# Patient Record
Sex: MALE | Race: WHITE
[De-identification: names, ages, dates, MRNs, and addresses within clinical notes are randomized per-mention and may not be internally consistent; named-entity substitution may affect disease eponyms.]

---

## 2018-03-22 ENCOUNTER — HOSPITAL ENCOUNTER (OUTPATIENT)
Dept: HOSPITAL 19 - COL.RAD | Age: 83
End: 2018-03-22
Attending: FAMILY MEDICINE
Payer: MEDICARE

## 2018-03-22 DIAGNOSIS — N40.0: Primary | ICD-10-CM

## 2018-03-22 DIAGNOSIS — K57.90: ICD-10-CM

## 2018-05-06 ENCOUNTER — HOSPITAL ENCOUNTER (EMERGENCY)
Dept: HOSPITAL 19 - COL.ER | Age: 83
Discharge: HOME | End: 2018-05-06
Payer: MEDICARE

## 2018-05-06 VITALS — HEART RATE: 64 BPM | SYSTOLIC BLOOD PRESSURE: 133 MMHG | DIASTOLIC BLOOD PRESSURE: 67 MMHG

## 2018-05-06 VITALS — WEIGHT: 170.42 LBS | HEIGHT: 70.98 IN | BODY MASS INDEX: 23.86 KG/M2

## 2018-05-06 VITALS — TEMPERATURE: 97 F

## 2018-05-06 DIAGNOSIS — I48.92: Primary | ICD-10-CM

## 2018-05-06 DIAGNOSIS — I48.91: ICD-10-CM

## 2018-05-06 DIAGNOSIS — I25.10: ICD-10-CM

## 2018-05-06 DIAGNOSIS — Z79.82: ICD-10-CM

## 2018-05-06 DIAGNOSIS — Z86.73: ICD-10-CM

## 2018-05-06 DIAGNOSIS — R55: ICD-10-CM

## 2018-05-06 DIAGNOSIS — Z79.02: ICD-10-CM

## 2018-05-06 LAB
ALBUMIN SERPL-MCNC: 3.2 GM/DL (ref 3.5–5)
ALP SERPL-CCNC: 52 U/L (ref 50–136)
ALT SERPL-CCNC: 29 U/L (ref 21–72)
ANION GAP SERPL CALC-SCNC: 10 MMOL/L (ref 7–16)
AST SERPL-CCNC: 18 U/L (ref 15–37)
BASOPHILS # BLD: 0.1 10*3/UL (ref 0–0.2)
BASOPHILS NFR BLD AUTO: 0.5 % (ref 0–2)
BILIRUB SERPL-MCNC: 1.3 MG/DL (ref 0–1)
BUN SERPL-MCNC: 20 MG/DL (ref 9–20)
CALCIUM SERPL-MCNC: 8 MG/DL (ref 8.4–10.2)
CHLORIDE SERPL-SCNC: 107 MMOL/L (ref 98–107)
CO2 SERPL-SCNC: 23 MMOL/L (ref 22–30)
CREAT SERPL-SCNC: 1.17 MG/DL (ref 0.66–1.25)
EOSINOPHIL # BLD: 0 10*3/UL (ref 0–0.7)
EOSINOPHIL NFR BLD: 0 % (ref 0–4)
ERYTHROCYTE [DISTWIDTH] IN BLOOD BY AUTOMATED COUNT: 12 % (ref 11.5–14.5)
GLUCOSE SERPL-MCNC: 115 MG/DL (ref 74–106)
GRANULOCYTES # BLD AUTO: 75.4 % (ref 42.2–75.2)
HCT VFR BLD AUTO: 44.7 % (ref 42–52)
HGB BLD-MCNC: 15.3 G/DL (ref 13.5–18)
LYMPHOCYTES # BLD: 1.7 10*3/UL (ref 1.2–3.4)
LYMPHOCYTES NFR BLD: 14.5 % (ref 20–51)
MCH RBC QN AUTO: 32 PG (ref 27–31)
MCHC RBC AUTO-ENTMCNC: 34 G/DL (ref 33–37)
MCV RBC AUTO: 93 FL (ref 80–100)
MONOCYTES # BLD: 1.1 10*3/UL (ref 0.1–0.6)
MONOCYTES NFR BLD AUTO: 9.3 % (ref 1.7–9.3)
NEUTROPHILS # BLD: 9 10*3/UL (ref 1.4–6.5)
PLATELET # BLD AUTO: 155 K/MM3 (ref 130–400)
PMV BLD AUTO: 9.9 FL (ref 7.4–10.4)
POTASSIUM SERPL-SCNC: 3.8 MMOL/L (ref 3.4–5)
PROT SERPL-MCNC: 6.1 GM/DL (ref 6.4–8.2)
RBC # BLD AUTO: 4.83 M/MM3 (ref 4.2–5.6)
SODIUM SERPL-SCNC: 140 MMOL/L (ref 137–145)

## 2020-09-08 ENCOUNTER — HOSPITAL ENCOUNTER (EMERGENCY)
Dept: HOSPITAL 19 - COL.ER | Age: 85
Discharge: HOME | End: 2020-09-08
Payer: MEDICARE

## 2020-09-08 VITALS — TEMPERATURE: 97.2 F

## 2020-09-08 VITALS — BODY MASS INDEX: 24.4 KG/M2 | HEIGHT: 70 IN | WEIGHT: 170.42 LBS

## 2020-09-08 VITALS — HEART RATE: 77 BPM

## 2020-09-08 DIAGNOSIS — Z79.01: ICD-10-CM

## 2020-09-08 DIAGNOSIS — E78.5: ICD-10-CM

## 2020-09-08 DIAGNOSIS — I25.10: ICD-10-CM

## 2020-09-08 DIAGNOSIS — Z88.0: ICD-10-CM

## 2020-09-08 DIAGNOSIS — E87.5: Primary | ICD-10-CM

## 2020-09-08 DIAGNOSIS — Z79.02: ICD-10-CM

## 2020-09-08 DIAGNOSIS — Z79.82: ICD-10-CM

## 2020-09-08 DIAGNOSIS — Z95.1: ICD-10-CM

## 2020-09-08 DIAGNOSIS — I10: ICD-10-CM

## 2020-09-08 LAB
ALBUMIN SERPL-MCNC: 4.4 GM/DL (ref 3.5–5)
ALP SERPL-CCNC: 75 U/L (ref 50–136)
ALT SERPL-CCNC: 30 U/L (ref 4–49)
ANION GAP SERPL CALC-SCNC: 11 MMOL/L (ref 7–16)
AST SERPL-CCNC: 34 U/L (ref 15–37)
BASOPHILS # BLD: 0.1 10*3/UL (ref 0–0.2)
BASOPHILS NFR BLD AUTO: 0.7 % (ref 0–2)
BILIRUB SERPL-MCNC: 1.5 MG/DL (ref 0–1)
BUN SERPL-MCNC: 20 MG/DL (ref 9–20)
CALCIUM SERPL-MCNC: 8.9 MG/DL (ref 8.4–10.2)
CHLORIDE SERPL-SCNC: 102 MMOL/L (ref 98–107)
CO2 SERPL-SCNC: 25 MMOL/L (ref 22–30)
CREAT SERPL-SCNC: 1.48 UMOL/L (ref 0.66–1.25)
CRP SERPL-MCNC: < 0.5 MG/DL (ref 0–0.9)
EOSINOPHIL # BLD: 0 10*3/UL (ref 0–0.7)
EOSINOPHIL NFR BLD: 0.2 % (ref 0–4)
ERYTHROCYTE [DISTWIDTH] IN BLOOD BY AUTOMATED COUNT: 11.9 % (ref 11.5–14.5)
GLUCOSE SERPL-MCNC: 125 MG/DL (ref 74–106)
GRANULOCYTES # BLD AUTO: 63.8 % (ref 42.2–75.2)
HCT VFR BLD AUTO: 46.6 % (ref 42–52)
HGB BLD-MCNC: 16 G/DL (ref 13.5–18)
INR BLD: 1.2 (ref 0.8–3)
LYMPHOCYTES # BLD: 2.1 10*3/UL (ref 1.2–3.4)
LYMPHOCYTES NFR BLD: 25.3 % (ref 20–51)
MAGNESIUM SERPL-MCNC: 2.1 MG/DL (ref 1.6–2.3)
MCH RBC QN AUTO: 32 PG (ref 27–31)
MCHC RBC AUTO-ENTMCNC: 34 G/DL (ref 33–37)
MCV RBC AUTO: 92 FL (ref 80–100)
MONOCYTES # BLD: 0.8 10*3/UL (ref 0.1–0.6)
MONOCYTES NFR BLD AUTO: 9.6 % (ref 1.7–9.3)
NEUTROPHILS # BLD: 5.2 10*3/UL (ref 1.4–6.5)
PLATELET # BLD AUTO: 174 K/MM3 (ref 130–400)
PMV BLD AUTO: 10.6 FL (ref 7.4–10.4)
POTASSIUM SERPL-SCNC: 3.9 MMOL/L (ref 3.4–5)
PROT SERPL-MCNC: 7.5 GM/DL (ref 6.4–8.2)
PROTHROMBIN TIME: 13.5 SECONDS (ref 9.7–12.8)
RBC # BLD AUTO: 5.05 M/MM3 (ref 4.2–5.6)
SODIUM SERPL-SCNC: 137 MMOL/L (ref 137–145)
TROPONIN I SERPL-MCNC: < 0.012 NG/ML (ref 0–0.04)

## 2020-10-05 ENCOUNTER — HOSPITAL ENCOUNTER (OUTPATIENT)
Dept: HOSPITAL 19 - COL.ER | Age: 85
Setting detail: OBSERVATION
LOS: 2 days | Discharge: HOME | End: 2020-10-07
Attending: STUDENT IN AN ORGANIZED HEALTH CARE EDUCATION/TRAINING PROGRAM | Admitting: STUDENT IN AN ORGANIZED HEALTH CARE EDUCATION/TRAINING PROGRAM
Payer: MEDICARE

## 2020-10-05 VITALS — SYSTOLIC BLOOD PRESSURE: 148 MMHG | DIASTOLIC BLOOD PRESSURE: 62 MMHG | HEART RATE: 68 BPM

## 2020-10-05 VITALS — SYSTOLIC BLOOD PRESSURE: 151 MMHG | DIASTOLIC BLOOD PRESSURE: 54 MMHG | TEMPERATURE: 98 F | HEART RATE: 62 BPM

## 2020-10-05 VITALS — HEIGHT: 70 IN | BODY MASS INDEX: 24.21 KG/M2 | WEIGHT: 169.09 LBS

## 2020-10-05 DIAGNOSIS — Z95.2: ICD-10-CM

## 2020-10-05 DIAGNOSIS — Z23: ICD-10-CM

## 2020-10-05 DIAGNOSIS — Z79.01: ICD-10-CM

## 2020-10-05 DIAGNOSIS — Z90.49: ICD-10-CM

## 2020-10-05 DIAGNOSIS — E03.9: ICD-10-CM

## 2020-10-05 DIAGNOSIS — Z79.82: ICD-10-CM

## 2020-10-05 DIAGNOSIS — N40.0: ICD-10-CM

## 2020-10-05 DIAGNOSIS — Z95.0: ICD-10-CM

## 2020-10-05 DIAGNOSIS — Z86.718: ICD-10-CM

## 2020-10-05 DIAGNOSIS — Z88.0: ICD-10-CM

## 2020-10-05 DIAGNOSIS — N18.9: ICD-10-CM

## 2020-10-05 DIAGNOSIS — Z95.1: ICD-10-CM

## 2020-10-05 DIAGNOSIS — I45.10: ICD-10-CM

## 2020-10-05 DIAGNOSIS — Z95.818: ICD-10-CM

## 2020-10-05 DIAGNOSIS — I65.21: ICD-10-CM

## 2020-10-05 DIAGNOSIS — R55: Primary | ICD-10-CM

## 2020-10-05 DIAGNOSIS — Z86.73: ICD-10-CM

## 2020-10-05 DIAGNOSIS — I25.10: ICD-10-CM

## 2020-10-05 DIAGNOSIS — I67.9: ICD-10-CM

## 2020-10-05 DIAGNOSIS — E78.5: ICD-10-CM

## 2020-10-05 DIAGNOSIS — I12.9: ICD-10-CM

## 2020-10-05 DIAGNOSIS — I48.91: ICD-10-CM

## 2020-10-05 LAB
ALBUMIN SERPL-MCNC: 4 GM/DL (ref 3.5–5)
ALP SERPL-CCNC: 62 U/L (ref 50–136)
ALT SERPL-CCNC: 28 U/L (ref 4–49)
ANION GAP SERPL CALC-SCNC: 8 MMOL/L (ref 7–16)
AST SERPL-CCNC: 26 U/L (ref 15–37)
BASOPHILS # BLD: 0.1 10*3/UL (ref 0–0.2)
BASOPHILS NFR BLD AUTO: 1 % (ref 0–2)
BILIRUB SERPL-MCNC: 1.3 MG/DL (ref 0–1)
BUN SERPL-MCNC: 22 MG/DL (ref 9–20)
CALCIUM SERPL-MCNC: 8.5 MG/DL (ref 8.4–10.2)
CHLORIDE SERPL-SCNC: 107 MMOL/L (ref 98–107)
CO2 SERPL-SCNC: 25 MMOL/L (ref 22–30)
CREAT SERPL-SCNC: 1.58 UMOL/L (ref 0.66–1.25)
EOSINOPHIL # BLD: 0 10*3/UL (ref 0–0.7)
EOSINOPHIL NFR BLD: 0.1 % (ref 0–4)
ERYTHROCYTE [DISTWIDTH] IN BLOOD BY AUTOMATED COUNT: 12 % (ref 11.5–14.5)
GLUCOSE SERPL-MCNC: 125 MG/DL (ref 74–106)
GRANULOCYTES # BLD AUTO: 62.5 % (ref 42.2–75.2)
HCT VFR BLD AUTO: 44.5 % (ref 42–52)
HGB BLD-MCNC: 15.1 G/DL (ref 13.5–18)
HYALINE CASTS #/AREA URNS LPF: (no result) /LPF
LYMPHOCYTES # BLD: 1.9 10*3/UL (ref 1.2–3.4)
LYMPHOCYTES NFR BLD: 25.2 % (ref 20–51)
MCH RBC QN AUTO: 32 PG (ref 27–31)
MCHC RBC AUTO-ENTMCNC: 34 G/DL (ref 33–37)
MCV RBC AUTO: 94 FL (ref 80–100)
MONOCYTES # BLD: 0.8 10*3/UL (ref 0.1–0.6)
MONOCYTES NFR BLD AUTO: 10.7 % (ref 1.7–9.3)
NEUTROPHILS # BLD: 4.8 10*3/UL (ref 1.4–6.5)
PH UR STRIP.AUTO: 6 [PH] (ref 5–8)
PLATELET # BLD AUTO: 166 K/MM3 (ref 130–400)
PMV BLD AUTO: 10.2 FL (ref 7.4–10.4)
POTASSIUM SERPL-SCNC: 4.9 MMOL/L (ref 3.4–5)
PROT SERPL-MCNC: 6.8 GM/DL (ref 6.4–8.2)
RBC # BLD AUTO: 4.76 M/MM3 (ref 4.2–5.6)
RBC # UR: (no result) /HPF
SODIUM SERPL-SCNC: 140 MMOL/L (ref 137–145)
SP GR UR STRIP.AUTO: 1.01 (ref 1–1.03)
TROPONIN I SERPL-MCNC: < 0.012 NG/ML (ref 0–0.04)
URN COLLECT METHOD CLASS: (no result)
UROBILINOGEN UR STRIP.AUTO-MCNC: 2 MG/DL

## 2020-10-05 PROCEDURE — A9585 GADOBUTROL INJECTION: HCPCS

## 2020-10-05 PROCEDURE — G0378 HOSPITAL OBSERVATION PER HR: HCPCS

## 2020-10-05 NOTE — NUR
Pt. arrived to the floor via wheelchair.  Pt. is A&OX3, assessment complete.
INT to lt. ac patent, IV fluids infusing per orders.  Pt. denies pain or other
needs, call light within reach.

## 2020-10-06 VITALS — DIASTOLIC BLOOD PRESSURE: 52 MMHG | SYSTOLIC BLOOD PRESSURE: 138 MMHG | TEMPERATURE: 98.3 F | HEART RATE: 61 BPM

## 2020-10-06 VITALS — TEMPERATURE: 97.7 F | HEART RATE: 54 BPM | DIASTOLIC BLOOD PRESSURE: 56 MMHG | SYSTOLIC BLOOD PRESSURE: 129 MMHG

## 2020-10-06 VITALS — TEMPERATURE: 97.6 F | HEART RATE: 71 BPM | DIASTOLIC BLOOD PRESSURE: 61 MMHG | SYSTOLIC BLOOD PRESSURE: 159 MMHG

## 2020-10-06 VITALS — SYSTOLIC BLOOD PRESSURE: 148 MMHG | TEMPERATURE: 98.2 F | HEART RATE: 74 BPM | DIASTOLIC BLOOD PRESSURE: 53 MMHG

## 2020-10-06 VITALS — TEMPERATURE: 97.5 F | SYSTOLIC BLOOD PRESSURE: 150 MMHG | HEART RATE: 66 BPM | DIASTOLIC BLOOD PRESSURE: 57 MMHG

## 2020-10-06 VITALS — SYSTOLIC BLOOD PRESSURE: 148 MMHG | DIASTOLIC BLOOD PRESSURE: 53 MMHG

## 2020-10-06 LAB
DIGOXIN SERPL-MCNC: 12.3 NG/ML (ref 3.7–17.9)
MAGNESIUM SERPL-MCNC: 2.2 MG/DL (ref 1.6–2.3)
TSH SERPL DL<=0.005 MIU/L-ACNC: 0.9 UIU/ML (ref 0.47–4.68)

## 2020-10-06 NOTE — NUR
met with patient to discuss discharge planning. Patient lives in
[place] with his wife, Sunita (ph#[phone]) who is also at bedside.
Patient sees Dr. Walden for primary care and picks up medications from [facility] with no difficulties. Patient does not use any DME and reports
independence with ADLS. Patient states he has DPOA-HC which designates his
wife, Sunita and his daughter, Aleena Nice but there is no copy in EMR.
Patient plans to return home at discharge and states he was ready to go home
today. SW will continue to follow as needed.

## 2020-10-06 NOTE — NUR
Went up to Patient's room to complete EEG as discussed prior with nurse.
Patient was also about to go to MRI. MRI would not be able to be completed
again until next week with Yass[employer] from [employer] available for pacemaker.
Patient's EEG will be completed tomorrow early morning. Discussed with
patient, his wife, and RN.
 
[place], RRT

## 2020-10-06 NOTE — NUR
Pt. sitting up in bed. Pt. is A&OX3, assessment complete. INT to lt. ac
patent.  Pt. denies pain or other needs, call light within reach.

## 2020-10-06 NOTE — NUR
Patient in bed resting. Alert and oriented x 3. Assessment complete. Denies
pain at this time. Denies further needs at this time.

## 2020-10-06 NOTE — NUR
Patient has done well throughout the day. Denies pain or further syncopal
episodes. Denies further needs at this time. Will report off to night shift.

## 2020-10-07 VITALS — TEMPERATURE: 98.8 F | HEART RATE: 67 BPM | DIASTOLIC BLOOD PRESSURE: 46 MMHG | SYSTOLIC BLOOD PRESSURE: 120 MMHG

## 2020-10-07 VITALS — SYSTOLIC BLOOD PRESSURE: 164 MMHG | TEMPERATURE: 98.3 F | DIASTOLIC BLOOD PRESSURE: 58 MMHG | HEART RATE: 68 BPM

## 2020-10-07 VITALS — TEMPERATURE: 98.5 F | DIASTOLIC BLOOD PRESSURE: 59 MMHG | HEART RATE: 65 BPM | SYSTOLIC BLOOD PRESSURE: 145 MMHG

## 2020-10-07 LAB
ANION GAP SERPL CALC-SCNC: 9 MMOL/L (ref 7–16)
BASOPHILS # BLD: 0.1 10*3/UL (ref 0–0.2)
BASOPHILS NFR BLD AUTO: 0.9 % (ref 0–2)
BUN SERPL-MCNC: 18 MG/DL (ref 9–20)
CALCIUM SERPL-MCNC: 8.7 MG/DL (ref 8.4–10.2)
CHLORIDE SERPL-SCNC: 108 MMOL/L (ref 98–107)
CO2 SERPL-SCNC: 22 MMOL/L (ref 22–30)
CREAT SERPL-SCNC: 1.31 UMOL/L (ref 0.66–1.25)
EOSINOPHIL # BLD: 0 10*3/UL (ref 0–0.7)
EOSINOPHIL NFR BLD: 0 % (ref 0–4)
ERYTHROCYTE [DISTWIDTH] IN BLOOD BY AUTOMATED COUNT: 12 % (ref 11.5–14.5)
GLUCOSE SERPL-MCNC: 88 MG/DL (ref 74–106)
GRANULOCYTES # BLD AUTO: 57.9 % (ref 42.2–75.2)
HCT VFR BLD AUTO: 43.3 % (ref 42–52)
HGB BLD-MCNC: 14.8 G/DL (ref 13.5–18)
LYMPHOCYTES # BLD: 2.4 10*3/UL (ref 1.2–3.4)
LYMPHOCYTES NFR BLD: 29.8 % (ref 20–51)
MCH RBC QN AUTO: 32 PG (ref 27–31)
MCHC RBC AUTO-ENTMCNC: 34 G/DL (ref 33–37)
MCV RBC AUTO: 93 FL (ref 80–100)
MONOCYTES # BLD: 0.9 10*3/UL (ref 0.1–0.6)
MONOCYTES NFR BLD AUTO: 11 % (ref 1.7–9.3)
NEUTROPHILS # BLD: 4.6 10*3/UL (ref 1.4–6.5)
PLATELET # BLD AUTO: 156 K/MM3 (ref 130–400)
PMV BLD AUTO: 10.9 FL (ref 7.4–10.4)
POTASSIUM SERPL-SCNC: 4.2 MMOL/L (ref 3.4–5)
RBC # BLD AUTO: 4.66 M/MM3 (ref 4.2–5.6)
SODIUM SERPL-SCNC: 139 MMOL/L (ref 137–145)

## 2020-10-07 NOTE — NUR
Patient alert and oriented, answers questions appropriately.  See assessment.
No of syncope or vertigo.  No other c/o at this time.

## 2020-10-07 NOTE — NUR
Discharge instructions reviewed with patient and spouse, verbalized
understanding.  Discharged via wheelchair to auto/home with spouse at 1130.

## 2021-05-19 ENCOUNTER — HOSPITAL ENCOUNTER (EMERGENCY)
Dept: HOSPITAL 19 - COL.ER | Age: 86
Discharge: HOME | End: 2021-05-19
Payer: MEDICARE

## 2021-05-19 VITALS — BODY MASS INDEX: 22.95 KG/M2 | HEIGHT: 70 IN | WEIGHT: 160.28 LBS

## 2021-05-19 VITALS — HEART RATE: 68 BPM | TEMPERATURE: 97.9 F | SYSTOLIC BLOOD PRESSURE: 118 MMHG | DIASTOLIC BLOOD PRESSURE: 54 MMHG

## 2021-05-19 DIAGNOSIS — Z88.0: ICD-10-CM

## 2021-05-19 DIAGNOSIS — N18.9: ICD-10-CM

## 2021-05-19 DIAGNOSIS — I25.10: ICD-10-CM

## 2021-05-19 DIAGNOSIS — Z79.82: ICD-10-CM

## 2021-05-19 DIAGNOSIS — Z79.01: ICD-10-CM

## 2021-05-19 DIAGNOSIS — I48.91: ICD-10-CM

## 2021-05-19 DIAGNOSIS — Z86.73: ICD-10-CM

## 2021-05-19 DIAGNOSIS — Z79.890: ICD-10-CM

## 2021-05-19 DIAGNOSIS — E78.5: ICD-10-CM

## 2021-05-19 DIAGNOSIS — Z95.9: ICD-10-CM

## 2021-05-19 DIAGNOSIS — R55: Primary | ICD-10-CM

## 2021-05-19 DIAGNOSIS — Z95.0: ICD-10-CM

## 2021-05-19 DIAGNOSIS — I12.9: ICD-10-CM

## 2021-05-19 DIAGNOSIS — E03.9: ICD-10-CM

## 2021-05-19 LAB
ALBUMIN SERPL-MCNC: 4.1 GM/DL (ref 3.5–5)
ALP SERPL-CCNC: 63 U/L (ref 50–136)
ALT SERPL-CCNC: 16 U/L (ref 4–49)
ANION GAP SERPL CALC-SCNC: 10 MMOL/L (ref 7–16)
APTT PPP: 28.4 SECONDS (ref 26–37)
AST SERPL-CCNC: 25 U/L (ref 15–37)
BASOPHILS # BLD: 0.1 10*3/UL (ref 0–0.2)
BASOPHILS NFR BLD AUTO: 0.5 % (ref 0–2)
BILIRUB SERPL-MCNC: 1.6 MG/DL (ref 0–1)
BUN SERPL-MCNC: 21 MG/DL (ref 9–20)
CALCIUM SERPL-MCNC: 9 MG/DL (ref 8.4–10.2)
CHLORIDE SERPL-SCNC: 103 MMOL/L (ref 98–107)
CO2 SERPL-SCNC: 25 MMOL/L (ref 22–30)
CREAT SERPL-SCNC: 1.22 UMOL/L (ref 0.66–1.25)
EOSINOPHIL # BLD: 0 10*3/UL (ref 0–0.7)
EOSINOPHIL NFR BLD: 0.2 % (ref 0–4)
ERYTHROCYTE [DISTWIDTH] IN BLOOD BY AUTOMATED COUNT: 11.6 % (ref 11.5–14.5)
GLUCOSE SERPL-MCNC: 122 MG/DL (ref 74–106)
GRANULOCYTES # BLD AUTO: 74.8 % (ref 42.2–75.2)
HCT VFR BLD AUTO: 45.2 % (ref 42–52)
HGB BLD-MCNC: 15.6 G/DL (ref 13.5–18)
INR BLD: 1.2 (ref 0.8–3)
LYMPHOCYTES # BLD: 1.8 10*3/UL (ref 1.2–3.4)
LYMPHOCYTES NFR BLD: 14.8 % (ref 20–51)
MCH RBC QN AUTO: 31 PG (ref 27–31)
MCHC RBC AUTO-ENTMCNC: 35 G/DL (ref 33–37)
MCV RBC AUTO: 90 FL (ref 80–100)
MONOCYTES # BLD: 1.1 10*3/UL (ref 0.1–0.6)
MONOCYTES NFR BLD AUTO: 9.3 % (ref 1.7–9.3)
NEUTROPHILS # BLD: 8.9 10*3/UL (ref 1.4–6.5)
PH UR STRIP.AUTO: 6 [PH] (ref 5–8)
PLATELET # BLD AUTO: 168 K/MM3 (ref 130–400)
PMV BLD AUTO: 10.6 FL (ref 7.4–10.4)
POTASSIUM SERPL-SCNC: 4.1 MMOL/L (ref 3.4–5)
PROT SERPL-MCNC: 7.3 GM/DL (ref 6.4–8.2)
PROTHROMBIN TIME: 13.7 SECONDS (ref 9.7–12.8)
RBC # BLD AUTO: 5.03 M/MM3 (ref 4.2–5.6)
RBC # UR: (no result) /HPF
SODIUM SERPL-SCNC: 138 MMOL/L (ref 137–145)
SP GR UR STRIP.AUTO: 1.01 (ref 1–1.03)
TROPONIN I SERPL-MCNC: 0.02 NG/ML (ref 0–0.04)
URN COLLECT METHOD CLASS: (no result)
UROBILINOGEN UR STRIP.AUTO-MCNC: 2 MG/DL

## 2021-06-02 ENCOUNTER — HOSPITAL ENCOUNTER (OUTPATIENT)
Dept: HOSPITAL 19 - COL.RAD | Age: 86
End: 2021-06-02
Attending: INTERNAL MEDICINE
Payer: MEDICARE

## 2021-06-02 DIAGNOSIS — K57.90: Primary | ICD-10-CM

## 2022-03-04 ENCOUNTER — HOSPITAL ENCOUNTER (OUTPATIENT)
Dept: HOSPITAL 19 - COL.CAR | Age: 87
Discharge: HOME | End: 2022-03-04
Attending: INTERNAL MEDICINE
Payer: MEDICARE

## 2022-03-04 VITALS — HEART RATE: 72 BPM | DIASTOLIC BLOOD PRESSURE: 65 MMHG | SYSTOLIC BLOOD PRESSURE: 100 MMHG

## 2022-03-04 VITALS — DIASTOLIC BLOOD PRESSURE: 60 MMHG | HEART RATE: 67 BPM | SYSTOLIC BLOOD PRESSURE: 133 MMHG

## 2022-03-04 VITALS — SYSTOLIC BLOOD PRESSURE: 156 MMHG | HEART RATE: 66 BPM | DIASTOLIC BLOOD PRESSURE: 95 MMHG

## 2022-03-04 VITALS — SYSTOLIC BLOOD PRESSURE: 145 MMHG | DIASTOLIC BLOOD PRESSURE: 70 MMHG | HEART RATE: 82 BPM

## 2022-03-04 VITALS — WEIGHT: 165.35 LBS | HEIGHT: 70 IN | BODY MASS INDEX: 23.67 KG/M2

## 2022-03-04 VITALS — HEART RATE: 67 BPM | SYSTOLIC BLOOD PRESSURE: 142 MMHG | DIASTOLIC BLOOD PRESSURE: 68 MMHG

## 2022-03-04 VITALS — SYSTOLIC BLOOD PRESSURE: 146 MMHG | HEART RATE: 68 BPM | DIASTOLIC BLOOD PRESSURE: 69 MMHG

## 2022-03-04 VITALS — HEART RATE: 66 BPM | SYSTOLIC BLOOD PRESSURE: 128 MMHG | DIASTOLIC BLOOD PRESSURE: 56 MMHG

## 2022-03-04 VITALS — TEMPERATURE: 97.7 F | SYSTOLIC BLOOD PRESSURE: 139 MMHG | HEART RATE: 65 BPM | DIASTOLIC BLOOD PRESSURE: 68 MMHG

## 2022-03-04 VITALS — SYSTOLIC BLOOD PRESSURE: 139 MMHG | HEART RATE: 65 BPM | DIASTOLIC BLOOD PRESSURE: 68 MMHG

## 2022-03-04 DIAGNOSIS — Z45.010: Primary | ICD-10-CM

## 2022-03-04 DIAGNOSIS — I49.5: ICD-10-CM

## 2022-03-04 DIAGNOSIS — I48.0: ICD-10-CM

## 2022-03-04 LAB
ANION GAP SERPL CALC-SCNC: 11 MMOL/L (ref 7–16)
BASOPHILS # BLD: 0.1 K/MM3 (ref 0–0.2)
BASOPHILS NFR BLD AUTO: 0.9 % (ref 0–2)
BUN SERPL-MCNC: 20 MG/DL (ref 8–26)
CALCIUM SERPL-MCNC: 9.4 MG/DL (ref 8.4–10.2)
CHLORIDE SERPL-SCNC: 108 MMOL/L (ref 98–107)
CO2 SERPL-SCNC: 25 MMOL/L (ref 23–31)
CREAT SERPL-SCNC: 1.25 MG/DL (ref 0.72–1.25)
EOSINOPHIL # BLD: 0 K/MM3 (ref 0–0.7)
EOSINOPHIL NFR BLD: 0.3 % (ref 0–4)
ERYTHROCYTE [DISTWIDTH] IN BLOOD BY AUTOMATED COUNT: 12 % (ref 11.5–14.5)
GLUCOSE SERPL-MCNC: 101 MG/DL (ref 70–99)
GRANULOCYTES # BLD AUTO: 54.9 % (ref 42.2–75.2)
HCT VFR BLD AUTO: 48.3 % (ref 42–52)
HGB BLD-MCNC: 16.6 G/DL (ref 13.5–18)
INR BLD: 1 (ref 0.8–3)
LYMPHOCYTES # BLD: 2.6 K/MM3 (ref 1.2–3.4)
LYMPHOCYTES NFR BLD: 32.4 % (ref 20–51)
MCH RBC QN AUTO: 31 PG (ref 27–31)
MCHC RBC AUTO-ENTMCNC: 34 G/DL (ref 33–37)
MCV RBC AUTO: 91 FL (ref 80–100)
MONOCYTES # BLD: 0.9 K/MM3 (ref 0.1–0.6)
MONOCYTES NFR BLD AUTO: 11.2 % (ref 1.7–9.3)
NEUTROPHILS # BLD: 4.4 K/MM3 (ref 1.4–6.5)
PLATELET # BLD AUTO: 160 K/MM3 (ref 130–400)
PMV BLD AUTO: 10.3 FL (ref 7.4–10.4)
POTASSIUM SERPL-SCNC: 4.8 MMOL/L (ref 3.5–4.5)
PROTHROMBIN TIME: 11.5 SECONDS (ref 9.7–12.8)
RBC # BLD AUTO: 5.31 M/MM3 (ref 4.2–5.6)
SODIUM SERPL-SCNC: 144 MMOL/L (ref 136–145)

## 2022-03-04 PROCEDURE — C1785 PMKR, DUAL, RATE-RESP: HCPCS

## 2022-03-04 NOTE — NUR
SEE MERGE FOR ALL MEDICATION ADMINISTRATION/DOSAGES AND INTRA POST PROCEDURE
SEDATION ASSESSMENTS. PRE PROCEDURE ASSESSMENT COMPLETED IN [facility].

## 2022-03-04 NOTE — NUR
DC instructions reviewed with pt and wife, both express understanding.
Dressing remains clean, dry and intact. Pt is assisted out to wife's car by
wheelchair.

## 2022-03-04 NOTE — NUR
Pt assisted up to restroom. Gait steady. He has tolerated PO without issue.
INT DC'd with catheter intact so patient may get dressed. Awaiting DC
paperwork from Dr Frazier. Dressing over gen change site remains clean, dry and
intact.

## 2022-03-23 ENCOUNTER — HOSPITAL ENCOUNTER (OUTPATIENT)
Dept: HOSPITAL 19 - COL.RAD | Age: 87
End: 2022-03-23
Attending: INTERNAL MEDICINE
Payer: MEDICARE

## 2022-03-23 DIAGNOSIS — Z95.0: Primary | ICD-10-CM

## 2022-10-20 ENCOUNTER — HOSPITAL ENCOUNTER (OUTPATIENT)
Dept: HOSPITAL 19 - COL.RAD | Age: 87
End: 2022-10-20
Attending: SURGERY
Payer: MEDICARE

## 2022-10-20 DIAGNOSIS — K44.9: Primary | ICD-10-CM

## 2022-10-20 DIAGNOSIS — Z90.49: ICD-10-CM

## 2022-10-20 DIAGNOSIS — K31.5: ICD-10-CM

## 2022-10-20 DIAGNOSIS — R91.1: ICD-10-CM
